# Patient Record
Sex: FEMALE | URBAN - METROPOLITAN AREA
[De-identification: names, ages, dates, MRNs, and addresses within clinical notes are randomized per-mention and may not be internally consistent; named-entity substitution may affect disease eponyms.]

---

## 2020-12-02 ENCOUNTER — SURGERY (OUTPATIENT)
Dept: URBAN - METROPOLITAN AREA EXTERNAL CLINIC 49 | Facility: EXTERNAL CLINIC | Age: 65
End: 2020-12-02
Payer: COMMERCIAL

## 2020-12-02 ENCOUNTER — POST-OPERATIVE VISIT (OUTPATIENT)
Dept: URBAN - METROPOLITAN AREA CLINIC 91 | Facility: CLINIC | Age: 65
End: 2020-12-02
Payer: COMMERCIAL

## 2020-12-02 DIAGNOSIS — Z48.810 ENCOUNTER FOR SURGICAL AFTERCARE FOLLOWING SURGERY ON A SENSE ORGAN: Primary | ICD-10-CM

## 2020-12-02 PROCEDURE — LENSX LENSX: CUSTOM | Performed by: SPECIALIST

## 2020-12-02 PROCEDURE — 99024 POSTOP FOLLOW-UP VISIT: CPT | Performed by: OPHTHALMOLOGY

## 2020-12-02 PROCEDURE — 66984 XCAPSL CTRC RMVL W/O ECP: CPT | Performed by: SPECIALIST

## 2020-12-02 ASSESSMENT — INTRAOCULAR PRESSURE
OS: 22
OS: 22

## 2020-12-02 NOTE — IMPRESSION/PLAN
Impression: S/P 1st day post-op CE IOL OS - . Encounter for surgical aftercare following surgery on a sense organ  Z48.810. Plan: Patient is compliant with post-op gtts.

## 2020-12-08 ENCOUNTER — POST-OPERATIVE VISIT (OUTPATIENT)
Dept: URBAN - NONMETROPOLITAN AREA CLINIC 19 | Facility: CLINIC | Age: 65
End: 2020-12-08
Payer: COMMERCIAL

## 2020-12-08 DIAGNOSIS — Z96.1 PRESENCE OF INTRAOCULAR LENS: Primary | ICD-10-CM

## 2020-12-08 ASSESSMENT — VISUAL ACUITY
OD: 20/20
OS: 20/20

## 2020-12-08 ASSESSMENT — INTRAOCULAR PRESSURE: OS: 16

## 2020-12-08 NOTE — IMPRESSION/PLAN
Impression: S/P CE/Standard IOL OS - . Presence of intraocular lens  Z96.1. Post operative instructions reviewed - Condition is improving - Plan: Patient is healing well status post CE IOL. It is ok for patient to discontinue wearing protective shield at bedtime, and may also resume normal activity at this time. I discussed post-op medication schedule with patient. Patient should continue with steroid 2 times a day x 3-4 weeks and then discontinue.

## 2021-01-19 ENCOUNTER — POST-OPERATIVE VISIT (OUTPATIENT)
Dept: URBAN - NONMETROPOLITAN AREA CLINIC 19 | Facility: CLINIC | Age: 66
End: 2021-01-19
Payer: COMMERCIAL

## 2021-01-19 DIAGNOSIS — H52.4 PRESBYOPIA: Primary | ICD-10-CM

## 2021-01-19 PROCEDURE — 99024 POSTOP FOLLOW-UP VISIT: CPT | Performed by: OPHTHALMOLOGY

## 2021-01-19 ASSESSMENT — INTRAOCULAR PRESSURE
OD: 14
OS: 12

## 2021-01-19 ASSESSMENT — VISUAL ACUITY
OS: 20/20
OD: 20/20

## 2021-01-19 NOTE — IMPRESSION/PLAN
Impression: S/P CE IOL  - 48 Days. Presence of intraocular lens  Z96.1. Post operative instructions reviewed - Condition is improving - Plan: Patient is healing well status post CE IOL. Patient will discontinue post operative drops unless directed by physician and follow up as scheduled Patient is compliant with Prednisolone OU BID.